# Patient Record
Sex: FEMALE
[De-identification: names, ages, dates, MRNs, and addresses within clinical notes are randomized per-mention and may not be internally consistent; named-entity substitution may affect disease eponyms.]

---
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
## 2018-06-27 ENCOUNTER — HOSPITAL ENCOUNTER (INPATIENT)
Dept: HOSPITAL 92 - L&D | Age: 18
LOS: 3 days | Discharge: HOME | End: 2018-06-30
Attending: FAMILY MEDICINE | Admitting: FAMILY MEDICINE
Payer: MEDICAID

## 2018-06-27 VITALS — BODY MASS INDEX: 27.4 KG/M2

## 2018-06-27 DIAGNOSIS — O41.1230: Primary | ICD-10-CM

## 2018-06-27 DIAGNOSIS — H57.8: ICD-10-CM

## 2018-06-27 DIAGNOSIS — Z3A.41: ICD-10-CM

## 2018-06-27 DIAGNOSIS — Z82.79: ICD-10-CM

## 2018-06-27 DIAGNOSIS — R00.0: ICD-10-CM

## 2018-06-27 LAB
HGB BLD-MCNC: 10.2 G/DL (ref 12–16)
MCH RBC QN AUTO: 28.3 PG (ref 25–35)
MCV RBC AUTO: 83.6 FL (ref 78–98)
PLATELET # BLD AUTO: 246 THOU/UL (ref 130–400)
RBC # BLD AUTO: 3.6 MILL/UL (ref 4–5.2)
SYPHILIS ANTIBODY INDEX: 0.03 S/CO
WBC # BLD AUTO: 11.9 THOU/UL (ref 4.8–10.8)

## 2018-06-27 PROCEDURE — 86780 TREPONEMA PALLIDUM: CPT

## 2018-06-27 PROCEDURE — 86900 BLOOD TYPING SEROLOGIC ABO: CPT

## 2018-06-27 PROCEDURE — 3E033VJ INTRODUCTION OF OTHER HORMONE INTO PERIPHERAL VEIN, PERCUTANEOUS APPROACH: ICD-10-PCS | Performed by: FAMILY MEDICINE

## 2018-06-27 PROCEDURE — 3E0P7VZ INTRODUCTION OF HORMONE INTO FEMALE REPRODUCTIVE, VIA NATURAL OR ARTIFICIAL OPENING: ICD-10-PCS | Performed by: FAMILY MEDICINE

## 2018-06-27 PROCEDURE — 51702 INSERT TEMP BLADDER CATH: CPT

## 2018-06-27 PROCEDURE — 87340 HEPATITIS B SURFACE AG IA: CPT

## 2018-06-27 PROCEDURE — 36415 COLL VENOUS BLD VENIPUNCTURE: CPT

## 2018-06-27 PROCEDURE — 86850 RBC ANTIBODY SCREEN: CPT

## 2018-06-27 PROCEDURE — 86901 BLOOD TYPING SEROLOGIC RH(D): CPT

## 2018-06-27 PROCEDURE — 85027 COMPLETE CBC AUTOMATED: CPT

## 2018-06-27 NOTE — PDOC.LDHP
Labor and Delivery H&P


Chief complaint: scheduled induction


HPI: 





Patient is a 18 yo  at 40.6 by LMP/1t u/s presents for late term induction.

  She reports good FM, no LOF, vaginal discharge, HA, scotoma, LE edema, or 

pain.  She reports occasional contractions and admits to a small amount of 

vaginal bloody discharge upon arrival to hospital.  


Current gestational age (weeks): 40 (40.6)


Due date: 18


Dating criteria: last menstrual period, first trimester ultrasound


Grav: 1


Para: 0


Current pregnancy complications: other (anemia of pregnancy, most recent Hg 9.7 

on 18)


Abnormal US findings: No


Past Medical History: 





none





Current medications: pre-nimo vitamins, iron


Previous surgical history: none


Social history: none





- Physical Exam


Vital signs reviewed and normal: yes


General: NAD, resting


Heart: RRR


Lungs: CTAB


Abdomen: gravid


Extremeties: no edema


FHT: category 1


[place] contractions every: q7-10 min





- Vaginal Exam


cm dilated: 2 (soft)


Effacement: 25%


Station: -2





- OB Labs


Blood type: O


RH: positive


Antibody Screen: negative


HIV: negative


RPR: negative


HEPSAg: negative


1 hour GCT: negative


GBS: negative


Urine drug screen: not done


Rubella: immune





- Assessment


L&D Assessment: medically indicated induction





 at 40.6 by LMP/1t u/s here for IOL for late term.  





sIUP: labs wnl with exception of anemia.  


Anemia of Pregnancy: last Hg 9.7, repeat today, continue Fe and PNV


FHx of Downs in father of baby's sister: Quad screen negative. 





Labor: cat 1 strip, cytotec placed at 2245, continue routine labor care and 

plan for recheck in 3h to determine if need for second cytotec vs pitocin.  





- Plan


Plan: admit to L&D, cervical ripening, labor augmentation if indicated





<Taina Gill - Last Filed: 18 22:47>





<Jonnathan Julien - Last Filed: 18 23:08>


Allergies/Adverse Reactions: 


 Allergies











Allergy/AdvReac Type Severity Reaction Status Date / Time


 


No Known Allergies Allergy   Verified 18 21:39














Attending Addendum





- Attending Addendum


Date/Time: 18 6539





I personally evaluated the patient and discussed the management with Dr. Gill.


I agree with and repeated the History, Examination, Assessment and Plan 

documented above with any addition or exceptions noted below.





Late term induction.  Cytotec placed.  Recheck in 4 hours.








<Jonnathan Julien - Last Filed: 18 23:08>

## 2018-06-28 LAB — HBSAG INDEX: 0.19 S/CO (ref 0–0.99)

## 2018-06-28 PROCEDURE — 0UQMXZZ REPAIR VULVA, EXTERNAL APPROACH: ICD-10-PCS | Performed by: FAMILY MEDICINE

## 2018-06-28 RX ADMIN — BUPIVACAINE HYDROCHLORIDE SCH MLS: 7.5 INJECTION, SOLUTION EPIDURAL; RETROBULBAR at 02:40

## 2018-06-28 RX ADMIN — Medication PRN MLS: at 14:30

## 2018-06-28 RX ADMIN — Medication PRN MLS: at 11:59

## 2018-06-28 RX ADMIN — HYDROCODONE BITARTRATE AND ACETAMINOPHEN PRN TAB: 5; 325 TABLET ORAL at 13:51

## 2018-06-28 RX ADMIN — DOCUSATE CALCIUM SCH MG: 240 CAPSULE, LIQUID FILLED ORAL at 21:30

## 2018-06-28 RX ADMIN — HYDROCODONE BITARTRATE AND ACETAMINOPHEN PRN TAB: 5; 325 TABLET ORAL at 18:12

## 2018-06-28 RX ADMIN — BUPIVACAINE HYDROCHLORIDE SCH MLS: 7.5 INJECTION, SOLUTION EPIDURAL; RETROBULBAR at 08:14

## 2018-06-28 NOTE — PDOC.LDPN
Labor & Delivery Progress Note





- Subjective


Subjective: comfortable





- Objective


Vital signs reviewed and normal: yes


General: NAD


Dilation: 10


Effacement: 100%


Station: 2+


FHT: category 1


[place] contractions every: 3-4





- Assessment


(1) Pregnancy


Current Visit: Yes   Status: Acute   


Plan: continue plan of care


-: 





 at 40.6 by LMP/1t u/s here for IOL for late term.  





sIUP: IOL reviewed, wnl with exception of anemia.  


Anemia of Pregnancy: last Hg 9.7, repeat today is 10, continue Fe and PNV


FHx of Downs in father of baby's sister: Quad screen negative. 





Labor: cat 1 strip, cytotec placed at 2245, rachana q2-4 min, SVE complete, 

+2. Epidural in place. Pitocin stopped 2/2 a period of tachysystole. 





expectant management

## 2018-06-28 NOTE — PDOC.LDPN
Labor & Delivery Progress Note





- Subjective


Subjective: comfortable





- Objective


Vital signs reviewed and normal: yes


General: NAD, resting


Uterine fundus: non tender


Dilation: 7


Effacement: 90%


Station: -2


FHT: category 1


[place] contractions every: q2-4 min


IUPC placed: yes





- Assessment


(1) Pregnancy


Current Visit: Yes   Status: Acute   


Plan: continue plan of care


-: 








 at 40.6 by LMP/1t u/s here for IOL for late term.  





sIUP: IOL reviewed, wnl with exception of anemia.  


Anemia of Pregnancy: last Hg 9.7, repeat today is 10, continue Fe and PNV


FHx of Downs in father of baby's sister: Quad screen negative. 





Labor: cat 1 strip, cytotec placed at 2245, rachana q2-4 min, SVE 7/90/-2. 

Epidural in place. Pit started at 0630, currently at rate of 6.





re-check in 2 hours





<Marcus Ogden - Last Filed: 18 08:11>





Attending Addendum





- Attending Addendum


Date/Time: 18 2236





I personally evaluated the patient and discussed the management with Dr. Ogden.


I agree with the History, Examination, Assessment and Plan documented above 

with any addition or exceptions noted below.


Active labor with expected progress.





<Leonel Carrion - Last Filed: 18 11:37>

## 2018-06-28 NOTE — OP-2
DELIVERING PHYSICIAN:  Dr. Marcus Ogden, Dr. Chaka De Dios.

 

ATTENDING:  Dr. Leonel Carrion.

 

PROCEDURE:  Spontaneous vaginal delivery.

 

ANESTHESIA:  Epidural.

 

ESTIMATED BLOOD LOSS:  150 mL.

 

PREOPERATIVE DIAGNOSES:

1.  Term intrauterine pregnancy in labor.

2.  Induction of labor for late term.

3.  Maternal fever.

 

POSTOPERATIVE DIAGNOSIS:

1.  Term intrauterine pregnancy, delivered.

2.  Induction of labor for late term.

3.  Maternal fever.

 

INDICATIONS:  A 17-year-old female G1 ,P1 presented to L&D for induction due to late term.

 

DELIVERY NOTE:  This is a 17-year-old female G1, P1-0-0-1, at 40 and 6 weeks who delivered a viable f
emale infant at 12:10 p.m.  Following an uneventful antepartum course, vigorous female was delivered 
over an intact perineum in the right occipital anterior position.  Anterior shoulder and the remainde
r of the body delivered.  No nuchal cord.  The head was held down and the mouth and nares were bulb s
uctioned.  Cord clamped after a delay of 1 minute and cord blood was collected.  Placenta delivered i
ntact in the [place] presentation with a 3-vessel cord noted.  Fundal massage was performed and the fu
ndus was firm.  Cervix and vagina were inspected.  A 2 cm right superficial periurethral tear was not
ed.  The lesion was repaired with 3 simple interrupted sutures of 3-0 Vicryl.  Epidural anesthesia wa
s sufficient.  Infant went to  nursery in good condition and will be started on ampicillin and
 gentamicin secondary to maternal fever of 100.7.  Workup for maternal fever was initiated.  Apgars w
ere 9 and 9 at 1 and 5 minutes respectively.  The patient tolerated the delivery well and went to the
 postpartum after routine recovery and care.

## 2018-06-28 NOTE — PDOC.LDPN
Labor & Delivery Progress Note





- Subjective


Subjective: painful contractions





- Objective


Vital signs reviewed and normal: yes


General: NAD, resting, breathing through contractions


Uterine fundus: palpable contractions


Dilation: 3


Effacement: 75%


Station: -2


FHT: category 1


[place] contractions every: q2min





- Assessment


(1) Pregnancy


Current Visit: Yes   Status: Acute   Comment:  at 40.6 by LMP/1t u/s here 

for IOL for late term.  





sIUP: IOL reviewed, wnl with exception of anemia.  


Anemia of Pregnancy: last Hg 9.7, repeat today is 10, continue Fe and PNV


FHx of Downs in father of baby's sister: Quad screen negative. 





Labor: cat 1 strip, cytotec placed at 2245, now favorable for pit but 

rachana q2min currently.  Will start pit if no change at next check.  

Continue routine labor care and plan for recheck in 2h.  Patient in pain, will 

call anesthesia for epidural.    


Plan: continue plan of care





<Taina Gill - Last Filed: 18 02:56>





Attending Addendum





- Attending Addendum


Date/Time: 18 0621





I personally evaluated the patient and discussed the management with Dr. Gill.


I agree with the History, Examination, Assessment and Plan documented above 

with any addition or exceptions noted below.





Cat 1, ctx q2-3m.  Recheck in 2 hours.  Anticipate .





<Jonnathan Julien - Last Filed: 18 06:22>

## 2018-06-28 NOTE — PDOC.LDPN
Labor & Delivery Progress Note





- Subjective


Subjective: comfortable





- Objective


Vital signs reviewed and normal: yes


General: NAD, resting


Uterine fundus: palpable contractions


Dilation: 4


Effacement: 75%


Station: -2


FHT: category 1


[place] contractions every: 2min


Procedures: SROM- clear fluid





- Assessment


(1) Pregnancy


Current Visit: Yes   Status: Acute   


Plan: continue plan of care


-: 





 at 40.6 by LMP/1t u/s here for IOL for late term.  





sIUP: IOL reviewed, wnl with exception of anemia.  


Anemia of Pregnancy: last Hg 9.7, repeat today is 10, continue Fe and PNV


FHx of Downs in father of baby's sister: Quad screen negative. 





Labor: cat 1 strip, cytotec placed at 2245, now favorable for pit but 

rachana q2min currently and making good progress, SVE 4/80/-2.  Will start 

pit if no change at next check.  SROM with clear fluid. Continue routine labor 

care and plan for recheck in 2h.  Epidural in place and patient resting 

comfortably.     





<Taina Gill - Last Filed: 18 05:02>





Attending Addendum





- Attending Addendum


Date/Time: 18 0624





I personally evaluated the patient and discussed the management with Dr. Gill.


I agree with the History, Examination, Assessment and Plan documented above 

with any addition or exceptions noted below.





Cat 1, ctx spaced now to q5m, will start pitocin.  Anticipate .








<Jonnathan Julien - Last Filed: 18 06:25>

## 2018-06-29 LAB
HGB BLD-MCNC: 8.6 G/DL (ref 12–16)
MCH RBC QN AUTO: 28.1 PG (ref 25–35)
MCV RBC AUTO: 86.5 FL (ref 78–98)
PLATELET # BLD AUTO: 173 THOU/UL (ref 130–400)
RBC # BLD AUTO: 3.06 MILL/UL (ref 4–5.2)
WBC # BLD AUTO: 14.9 THOU/UL (ref 4.8–10.8)

## 2018-06-29 RX ADMIN — DOCUSATE CALCIUM SCH MG: 240 CAPSULE, LIQUID FILLED ORAL at 08:04

## 2018-06-29 RX ADMIN — DOCUSATE CALCIUM SCH MG: 240 CAPSULE, LIQUID FILLED ORAL at 21:13

## 2018-06-29 NOTE — PDOC.PP
Addendum entered and electronically signed by Marcus Ogden MD  18 11:16: 





Maternal Fever


- received 1 dose of amp/gent 


- afebrile since delivery, will monitor





Original Note:








Post Partum Progress Note


Post Partum Day #: 1


Subjective: 





This morning patient states she is doing well. Ambulating and voiding without 

difficulty. No fevers overnight. No N/V/D. She has not had a BM or flatus. She 

reports very minimal bleeding overnight. 


PO intake tolerated: yes


Flatus: no


Ambulation: yes


 Vital Signs (12 hours)











  Temp Pulse Resp BP


 


 18 07:15  97.9 F  69  18  106/60


 


 18 04:05  98.2 F  82  16  104/54 L


 


 18 23:45  97.7 F  65  18  100/59 L








 Weight











Weight                         74.843 kg

















- Physical Examination


General: NAD


Cardiovascular: no m/r/g, RRR


Respiratory: clear to auscultation bilaterally


Abdominal: + bowel sounds, no distention


Fundus firm & at: 3cm below umbilicus


Extremities: negative homans (B)


Neurological: no gross focal deficits


Psychiatric: normal affect


Result Diagrams: 


 18 06:53





Additional Labs: 


 Post Partum Labs











Blood Type  O POSITIVE   18  21:52    


 


Hep Bs Antigen  Non-Reactive S/CO (NonReactive)   18  21:52    











(1) Pregnancy


Status: Acute   





(2) Pregnancy


Status: Acute   





(3) Normal postpartum course


Code(s): Z39.2 - ENCOUNTER FOR ROUTINE POSTPARTUM FOLLOW-UP   Status: Acute   





- Assessment/Plan





# Post partum day 1


-  stay 48 hours


- bleeding resolved


- pain well controlled


- standard post partum care





<Marcus Ogden - Last Filed: 18 10:40>


 Vital Signs (12 hours)











  Temp Pulse Resp BP


 


 18 00:00  97.7 F  69  18  107/62


 


 18 20:00  97.7 F  77  18  106/62








 Weight











Weight                         74.843 kg














Result Diagrams: 


 18 06:53





Additional Labs: 


 Post Partum Labs











Blood Type  O POSITIVE   18  21:52    


 


Hep Bs Antigen  Non-Reactive S/CO (NonReactive)   18  21:52    














<Hue Macdonald - Last Filed: 18 04:28>





Attending Addendum





- Attending Addendum


Date/Time: 18 1026





I personally evaluated the patient and discussed the management with Dr. Ogden 

and Dr. Kearns


I agree with the History, Examination, Assessment and Plan documented above 

with any addition or exceptions noted below.





Patient dx with intramniotic infection during labor. Received 1 dose amp/gen. 

Remains afebrile since delivery. Fundus nontender. No abnormal lochia. 


Will continue inpatient monitoring. Infant currently on amp/gen until blood 

cultures negative. 


Will discuss contraception tomorrow. 





ABrayMD





<Hue Macdonald - Last Filed: 18 04:28>

## 2018-06-30 VITALS — DIASTOLIC BLOOD PRESSURE: 58 MMHG | TEMPERATURE: 98.1 F | SYSTOLIC BLOOD PRESSURE: 101 MMHG

## 2018-06-30 RX ADMIN — DOCUSATE CALCIUM SCH MG: 240 CAPSULE, LIQUID FILLED ORAL at 09:26

## 2018-06-30 NOTE — PDOC.PP
Addendum entered and electronically signed by Marcus Ogden MD  18 09:54: 





Intra-amniotic infection


- temp 100.7 before delivery


- received 1 dose of amp/gent


- afebrile PP course








Original Note:








Post Partum Progress Note


Post Partum Day #: 2


Subjective: 





Mother states she is feeling well this morning. Was afebrile yesterday. She is 

tolerating PO intake without difficulty. She has had some bleeding, she says 

about like a regular period. She is ambulating without difficulty.


PO intake tolerated: yes


Flatus: yes


Ambulation: yes


 Vital Signs (12 hours)











  Temp Pulse Resp BP


 


 18 07:52  98.1 F  67  16  101/58 L


 


 18 06:00  97.9 F   


 


 18 00:00  97.7 F  69  18  107/62








 Weight











Weight                         74.843 kg

















- Physical Examination


General: NAD


Cardiovascular: no m/r/g, RRR


Respiratory: clear to auscultation bilaterally, non-labored breathing


Abdominal: + bowel sounds, no distention


Fundus firm & at: 3cm below umbilicus


Extremities: negative homans (B)


Result Diagrams: 


 18 06:53





Additional Labs: 


 Post Partum Labs











Blood Type  O POSITIVE   18  21:52    


 


Hep Bs Antigen  Non-Reactive S/CO (NonReactive)   18  21:52    











(1) Pregnancy


Status: Acute   





(2) Pregnancy


Status: Acute   





(3) Normal postpartum course


Code(s): Z39.2 - ENCOUNTER FOR ROUTINE POSTPARTUM FOLLOW-UP   Status: Acute   





- Assessment/Plan





# Post partum day 2


- , ready for d/c today


- bleeding WNL


- pain well controlled


- breastfeeding, spoke with lactation consultant





# Maternal Fever


- temp 100.7 before delivery


- received 1 dose of amp/gent


- afebrile PP course





Dispo: f/u for PP clinic visit\


Dr. Cortez will be pediatrician








<Marcus Ogden - Last Filed: 18 08:47>


 Vital Signs (12 hours)











  Temp Pulse Resp BP


 


 18 09:25  98.1 F  67  16 


 


 18 07:52  98.1 F  67  16  101/58 L








 Weight











Weight                         74.843 kg














Result Diagrams: 


 18 06:53





Additional Labs: 


 Post Partum Labs











Blood Type  O POSITIVE   18  21:52    


 


Hep Bs Antigen  Non-Reactive S/CO (NonReactive)   18  21:52    














<Hue Macdonald - Last Filed: 18 18:31>





Attending Addendum





- Attending Addendum


Date/Time: 18 1830





I personally evaluated the patient and discussed the management with Dr. Ogden


I agree with the History, Examination, Assessment and Plan documented above 

with any addition or exceptions noted below.





No evidence of postpartum infections. Afebrile. Lochia appropriate. Breast and 

bottle. 


Will need follow up in 2 wks. 


Nexplanon. 





ABrayMD





<Hue Macdonald - Last Filed: 18 18:31>

## 2018-06-30 NOTE — PDOC.EVN
Event Note





- Event Note


Event Note: 





Called to bedside to evaluate L sided ocular redness


Mild redness noted on R eye, no purulence


Patient states she has some itchiness on the affected side, no visual deficit


Does not appear enflamed as charecteristic of viral conjunctivitis, appears to 

be mild irritation





Advise saline drops 2-3 times per day, return to clinic if it should worsen


Advised mother to be careful to practice good hand hygiene before touching baby 

as viral conjunctivitis cannot be ruled out

## 2019-06-13 ENCOUNTER — HOSPITAL ENCOUNTER (EMERGENCY)
Dept: HOSPITAL 92 - ERS | Age: 19
Discharge: HOME | End: 2019-06-13
Payer: SELF-PAY
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
## 2019-06-13 DIAGNOSIS — S16.1XXA: Primary | ICD-10-CM

## 2019-06-13 PROCEDURE — 96372 THER/PROPH/DIAG INJ SC/IM: CPT

## 2019-06-13 PROCEDURE — 72125 CT NECK SPINE W/O DYE: CPT
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
